# Patient Record
Sex: MALE | Race: WHITE | NOT HISPANIC OR LATINO | URBAN - METROPOLITAN AREA
[De-identification: names, ages, dates, MRNs, and addresses within clinical notes are randomized per-mention and may not be internally consistent; named-entity substitution may affect disease eponyms.]

---

## 2024-10-20 ENCOUNTER — OFFICE VISIT (OUTPATIENT)
Dept: URGENT CARE | Age: 24
End: 2024-10-20
Payer: COMMERCIAL

## 2024-10-20 VITALS
OXYGEN SATURATION: 98 % | TEMPERATURE: 97.6 F | HEART RATE: 101 BPM | SYSTOLIC BLOOD PRESSURE: 113 MMHG | DIASTOLIC BLOOD PRESSURE: 71 MMHG

## 2024-10-20 DIAGNOSIS — J00 NASOPHARYNGITIS: Primary | ICD-10-CM

## 2024-10-20 DIAGNOSIS — Z20.822 SUSPECTED COVID-19 VIRUS INFECTION: ICD-10-CM

## 2024-10-20 LAB
POC RAPID INFLUENZA A: NEGATIVE
POC RAPID INFLUENZA B: NEGATIVE
POC SARS-COV-2 AG BINAX: NORMAL

## 2024-10-20 PROCEDURE — 99203 OFFICE O/P NEW LOW 30 MIN: CPT

## 2024-10-20 PROCEDURE — 87811 SARS-COV-2 COVID19 W/OPTIC: CPT

## 2024-10-20 PROCEDURE — 87804 INFLUENZA ASSAY W/OPTIC: CPT

## 2024-10-20 ASSESSMENT — ENCOUNTER SYMPTOMS
RHINORRHEA: 1
FEVER: 1
DIARRHEA: 1
NAUSEA: 1
COUGH: 1

## 2024-10-20 NOTE — PROGRESS NOTES
Subjective   Patient ID: Judson Mansfield is a 24 y.o. male. They present today with a chief complaint of Nasal Congestion.    History of Present Illness  4-year-old male presents to clinic today with complaints of nasal congestion, cough, sore throat, diarrhea and nausea.  Patient states has been ongoing for 2 days.  He denies any chest pain or shortness of breath.  He states his ears feel clogged.  Patient has taken some Tylenol and Advil.  He took DayQuil as well.          Past Medical History  Allergies as of 10/20/2024    (No Known Allergies)       (Not in a hospital admission)       Past Medical History:   Diagnosis Date    Acute serous otitis media, unspecified ear 07/24/2018    Acute serous otitis media    Acute tonsillitis, unspecified 02/20/2019    Acute tonsillitis    Encounter for immunization     Encounter for immunization    Encounter for routine child health examination without abnormal findings     Well child visit    Enterocolitis due to Clostridium difficile, not specified as recurrent     C. difficile diarrhea    Enteroviral vesicular pharyngitis 02/01/2016    Herpangina    Hemorrhage of anus and rectum 11/30/2020    Bright red blood per rectum    Localized enlarged lymph nodes 07/24/2018    Reactive cervical lymphadenopathy    Otalgia, right ear 02/01/2016    Referred otalgia, right    Other fecal abnormalities 06/18/2019    Mucous in stools    Other fecal abnormalities 02/16/2017    Loose stools    Otitis media, unspecified, bilateral 06/18/2015    Bilateral otitis media    Otitis media, unspecified, left ear 02/01/2016    Left otitis media    Personal history of other diseases of the nervous system and sense organs 02/01/2016    History of impacted cerumen    Personal history of other diseases of the respiratory system 07/24/2018    History of sore throat    Personal history of other diseases of the respiratory system 02/01/2016    History of sore throat    Personal history of other diseases of the  respiratory system 02/20/2019    History of acute sinusitis    Personal history of other specified conditions 06/18/2019    History of urinary frequency    Personal history of other specified conditions 07/29/2013    History of shortness of breath    Personal history of other specified conditions 06/06/2017    History of abdominal pain    Reactive attachment disorder of childhood     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   HENT:  Positive for congestion and rhinorrhea.    Respiratory:  Positive for cough.    Gastrointestinal:  Positive for diarrhea and nausea.                                  Objective    Vitals:    10/20/24 1357   BP: 113/71   Pulse: 101   Temp: 36.4 °C (97.6 °F)   SpO2: 98%     No LMP for male patient.    Physical Exam  Constitutional:       General: He is not in acute distress.     Appearance: Normal appearance.   HENT:      Right Ear: Tympanic membrane and ear canal normal.      Left Ear: Tympanic membrane and ear canal normal.      Mouth/Throat:      Mouth: Mucous membranes are moist.      Pharynx: No oropharyngeal exudate or posterior oropharyngeal erythema.   Cardiovascular:      Rate and Rhythm: Normal rate and regular rhythm.      Heart sounds: Normal heart sounds.   Pulmonary:      Effort: Pulmonary effort is normal.      Breath sounds: Normal breath sounds.   Abdominal:      General: Abdomen is flat. Bowel sounds are normal.      Palpations: Abdomen is soft.      Tenderness: There is no abdominal tenderness.   Neurological:      Mental Status: He is alert.         Procedures    Point of Care Test & Imaging Results from this visit  No results found for this  visit on 10/20/24.   No results found.    Diagnostic study results (if any) were reviewed by [facility].    Assessment/Plan   Allergies, medications, history, and pertinent labs/EKGs/Imaging reviewed by Shashi Ortega PA-C.     Medical Decision Making  Cardiopulmonary exam within normal limits.  Vital signs stable.  Rapid testing negative.  I discussed with patient that this is most likely viral in nature.  Advised him on proper over-the-counter medications to supplement with.    Orders and Diagnoses  There are no diagnoses linked to this encounter.    Medical Admin Record      Patient disposition: Home    Electronically signed by [facility]  2:08 PM

## 2024-10-20 NOTE — PATIENT INSTRUCTIONS
Consider taking Mucinex for congestion. Make sure to drink plenty of fluids while taking this medication as it increases its effectiveness.     Consider Zyrtec or Claritin    Consider Flonase Nasal Spray    Consider taking Sudafed to help decrease any congestion. If no history of high blood pressure.  Consider Corcedin BP for high blood pressure.    Use throat lozenges, buckwheat honey, gargling salt water to help relieve sore throat symptoms.     Recommend inhaling steam from a hot shower or hot bath as well as using saline sinus rinse for congestion. Recommend Duglas Med sinus rinse. Make sure to use bottled or distilled water.

## 2024-10-20 NOTE — LETTER
October 20, 2024     Patient: Judson Mansfield   YOB: 2000   Date of Visit: 10/20/2024       To Whom It May Concern:    Judson Mansfield was seen in my clinic on 10/20/2024 at 1:55 pm. Please excuse Judson for his absence from work on this day to make the appointment.    If you have any questions or concerns, please don't hesitate to call.         Sincerely,         [facility]        CC: No Recipients